# Patient Record
Sex: MALE | Race: WHITE | NOT HISPANIC OR LATINO | URBAN - METROPOLITAN AREA
[De-identification: names, ages, dates, MRNs, and addresses within clinical notes are randomized per-mention and may not be internally consistent; named-entity substitution may affect disease eponyms.]

---

## 2018-11-01 ENCOUNTER — OUTPATIENT (OUTPATIENT)
Dept: OUTPATIENT SERVICES | Facility: HOSPITAL | Age: 53
LOS: 1 days | Discharge: STILL ADMITTED OR ADMINISTRATIVE | End: 2018-11-01

## 2018-11-02 DIAGNOSIS — F10.20 ALCOHOL DEPENDENCE, UNCOMPLICATED: ICD-10-CM

## 2018-11-02 DIAGNOSIS — I10 ESSENTIAL (PRIMARY) HYPERTENSION: ICD-10-CM

## 2018-11-02 DIAGNOSIS — F39 UNSPECIFIED MOOD [AFFECTIVE] DISORDER: ICD-10-CM

## 2020-09-15 PROBLEM — Z00.00 ENCOUNTER FOR PREVENTIVE HEALTH EXAMINATION: Status: ACTIVE | Noted: 2020-09-15

## 2020-10-01 ENCOUNTER — APPOINTMENT (OUTPATIENT)
Dept: ORTHOPEDIC SURGERY | Facility: CLINIC | Age: 55
End: 2020-10-01
Payer: MEDICAID

## 2020-10-01 VITALS
HEIGHT: 73 IN | BODY MASS INDEX: 35.12 KG/M2 | DIASTOLIC BLOOD PRESSURE: 82 MMHG | WEIGHT: 265 LBS | SYSTOLIC BLOOD PRESSURE: 135 MMHG

## 2020-10-01 DIAGNOSIS — Z78.9 OTHER SPECIFIED HEALTH STATUS: ICD-10-CM

## 2020-10-01 PROCEDURE — 20610 DRAIN/INJ JOINT/BURSA W/O US: CPT | Mod: 50

## 2020-10-01 PROCEDURE — 99204 OFFICE O/P NEW MOD 45 MIN: CPT | Mod: 25

## 2020-10-01 PROCEDURE — 73564 X-RAY EXAM KNEE 4 OR MORE: CPT | Mod: 50

## 2020-10-01 RX ORDER — HYALURONATE SODIUM 20 MG/2 ML
20 SYRINGE (ML) INTRAARTICULAR
Qty: 2 | Refills: 0 | Status: COMPLETED | COMMUNITY
Start: 2020-10-01 | End: 2020-10-04

## 2020-10-04 NOTE — HISTORY OF PRESENT ILLNESS
[4] : a current pain level of 4/10 [Sitting] : worsened by sitting [Ice] : relieved by ice [de-identified] : SCOTTY is a 55 year old male who presents today for initial evaluation of bilateral knee pain that is cramping in nature. He was previously treated by Dr. Calhoun and performed PT.\par

## 2020-10-04 NOTE — PHYSICAL EXAM
[de-identified] : Bilateral knee: There is moderate effusion. Range of motion is 0-120°. Painful at the extremes of range of motion. \par There is medial and lateral joint line tenderness. \par Quadriceps strength is 4/5. There is some muscle loss in the quadriceps. \par Anteroposterior and mediolateral stability is intact Misha test is negative. Alignment of the knee is in 5° of varus. [de-identified] : Long standing knee, AP knee, lateral knee, and patellar views of the BILAT knees were ordered and taken in the office and demonstrate degenerative joint disease of the medial, lateral and patellofemoral compartments of the knee with joint space narrowing, osteophyte formation, and subchondral sclerosis.

## 2020-10-04 NOTE — PROCEDURE
[de-identified] : Injection: Right knee joint.\par Indication: OA/ Pain\par \par A discussion was had with the patient regarding this procedure and all questions were answered. All risks, benefits and alternatives were discussed. These included but were not limited to bleeding, infection, and allergic reaction. Alcohol was used to clean the skin, and betadine was used to sterilize and prep the area in the supero-lateral aspect of the right knee. Ethyl chloride spray was then used as a topical anesthetic. A 22-gauge needle was used to inject 3cc of 1% Xylocaine, 2cc of 0.25% Bupivacaine and 1cc of 40mg/mL Triamcinolone Acetonide into the right knee. A sterile bandage was then applied. The patient tolerated the procedure well and there were no complications\par \par Injection: Left knee joint.\par Indication: OA/ Pain\par \par A discussion was had with the patient regarding this procedure and all questions were answered. All risks, benefits and alternatives were discussed. These included but were not limited to bleeding, infection, and allergic reaction. Alcohol was used to clean the skin, and betadine was used to sterilize and prep the area in the supero-lateral aspect of the left knee. Ethyl chloride spray was then used as a topical anesthetic. A 22-gauge needle was used to inject 3cc of 1% Xylocaine, 2cc of 0.25% Bupivacaine and 1cc of 40mg/mL Triamcinolone Acetonide into the left knee. A sterile bandage was then applied. The patient tolerated the procedure well and there were no complications.\par

## 2020-10-04 NOTE — DISCUSSION/SUMMARY
[de-identified] : SCOTTY VILLALBA is a 55 year male experiencing BILAT knee pain, which is moderate in intensity. He admits to sitting causing stiffness and difficulty with standing. Once he starts walking he feels it gets better. He loves to be active and walks a lot and plays golf of which walking more than 9 holes is bothersome to his knees. He denies instability.\par \par After review of his radiographs we discussed that his primary complaint for pain to be arthritis. I discussed the treatment of degenerative arthritis with the patient at length today. I described the spectrum of treatment from nonoperative modalities to total joint arthroplasty. Noninvasive and nonoperative treatment modalities include weight reduction, activity modification with low impact exercise, PRN use of acetaminophen or anti-inflammatory medication if tolerated, glucosamine/chondroitin supplements, and physical therapy. Further treatments can include corticosteroid injection and the use of hyaluronic acid injections. Definitive treatment can certainly include total joint arthroplasty also. The risks and benefits of each treatment options was discussed and all questions were answered. Patient due to his acute pain elects for BILAT knee injections. He denies diabetes. He tolerated the procedures well. He may attempt PT for gentle ROM, strengthening and decreasing pain modalities. If it causes an increase in pain he will d/c. We will call us once the cortisone injections wear off to receive gel injections if approved by his insurance. They were ordered today. He agrees with the above plan and all questions were answered.\par \par \par

## 2020-11-30 ENCOUNTER — APPOINTMENT (OUTPATIENT)
Dept: ORTHOPEDIC SURGERY | Facility: CLINIC | Age: 55
End: 2020-11-30
Payer: MEDICAID

## 2020-11-30 PROCEDURE — 99072 ADDL SUPL MATRL&STAF TM PHE: CPT

## 2020-11-30 PROCEDURE — 20610 DRAIN/INJ JOINT/BURSA W/O US: CPT | Mod: RT

## 2020-11-30 PROCEDURE — 99214 OFFICE O/P EST MOD 30 MIN: CPT | Mod: 25

## 2020-11-30 NOTE — PHYSICAL EXAM
[de-identified] : Bilateral Knee Physical Examination:\par \par General: Alert and oriented x3.  In no acute distress.  Pleasant in nature with a normal affect.  No apparent respiratory distress. \par \par Erythema, Warmth, Rubor: Negative\par Swelling/Edema: Negative\par ROM: 0-120 degrees\par Misha's Test: Negative \par Medial Joint Line TTP: Positive\par Lateral Joint Line TTP: Positive\par Lachman Exam/Anterior Drawer/Pivot Shift Test: Negative \par MCL Pain: Negative\par LCL Pain: Negative\par Iliotibial Band Pain: Negative\par Patellofemoral Joint Pain: Negative\par Patellar Tendon TTP: Negative\par Pes Anserinus TTP: Negative\par Homans Sign: Negative\par Posterior Knee Pain: Negative\par Neuro: Intact with no sensory or motor deficits\par  [de-identified] : X-rays of bilateral knees reviewed: Right knee severe bone-on-bone osteoarthritis lateral and patellofemoral compartments. Left knee arthritis tricompartmental.

## 2020-11-30 NOTE — DISCUSSION/SUMMARY
[de-identified] : Assessment: Bilateral Knee Osteoarthritis/Pain\par \par Plan:\par 1. RICE Therapy.\par 2. Antiinflammatories/Tylenol as needed for pain of discomfort. \par 3. The patient was advised to rest the knee for 24-48 hours post injection.  The patient is able to resume normal activities in 24-48 hours post injection if the patient is experiencing minimal to no pain. \par 4. Continue with a home exercise/stretching program. \par 5. All the patients questions were answered.  If the patient is experiencing any problems or has concerns they were advised to call the office or make an appointment to come in to be evaluated.\par \par *The patient will follow up next week for a left knee cortisone injection.\par

## 2020-11-30 NOTE — HISTORY OF PRESENT ILLNESS
[de-identified] : Jones is a 54 yo male who presents with bilateral knee pain from OA, R>L.  The patient is seeking a cortisone injection for both knees.  He denies locking or catching of the knees.  No other complaint.\par \par Saw Dr. Nicole beginning of October where he received a cortisone injection bilateral knees.

## 2020-11-30 NOTE — PROCEDURE
[de-identified] : Laterality: Right Knee\par \par The risks and benefits of the injection were reviewed with the patient today in office and all their questions were answered.  The injection site was the anterolateral aspect of the knee with the patient sitting up, knees flexed to 90 degrees.  Prior to giving the injection the injection site was prepped with Chloraprep and a sterile field was created.  Sterile technique was carried out throughout the procedure.  After verbal consent from the patient we went ahead and injected the Right knee today with 2 ml 40 mg Depo-Medrol, 4 ml 1% lidocaine and 4 ml of .50% Bupivacaine for a total of 10 ml with a 22 geo 1.5" needle.  The medication was placed into the knee without complication.  Post injection the patient reported no pain, had a normal gait and good motion of the knee.  The patient denied numbness and tingling going down their leg.  There were no complications during the procedure.\par

## 2020-12-07 ENCOUNTER — APPOINTMENT (OUTPATIENT)
Dept: ORTHOPEDIC SURGERY | Facility: CLINIC | Age: 55
End: 2020-12-07
Payer: MEDICAID

## 2020-12-07 PROCEDURE — 99072 ADDL SUPL MATRL&STAF TM PHE: CPT

## 2020-12-07 PROCEDURE — 99213 OFFICE O/P EST LOW 20 MIN: CPT | Mod: 25

## 2020-12-07 PROCEDURE — 20610 DRAIN/INJ JOINT/BURSA W/O US: CPT | Mod: LT

## 2020-12-07 RX ORDER — MELOXICAM 15 MG/1
15 TABLET ORAL
Qty: 1 | Refills: 2 | Status: ACTIVE | COMMUNITY
Start: 2020-12-07 | End: 1900-01-01

## 2020-12-07 NOTE — DISCUSSION/SUMMARY
[de-identified] : Assessment: Bilateral Knee Osteoarthritis/Pain\par \par Plan:\par 1. RICE Therapy.\par 2. Antiinflammatories/Tylenol as needed for pain of discomfort. \par 3. The patient was advised to rest the knee for 24-48 hours post injection.  The patient is able to resume normal activities in 24-48 hours post injection if the patient is experiencing minimal to no pain. \par 4. Continue with a home exercise/stretching program. \par 5. All the patients questions were answered.  If the patient is experiencing any problems or has concerns they were advised to call the office or make an appointment to come in to be evaluated.\par \par *followup as needed.\par

## 2020-12-07 NOTE — PHYSICAL EXAM
[de-identified] : Bilateral Knee Physical Examination:\par \par General: Alert and oriented x3.  In no acute distress.  Pleasant in nature with a normal affect.  No apparent respiratory distress. \par \par Erythema, Warmth, Rubor: Negative\par Swelling/Edema: Negative\par ROM: 0-120 degrees\par Misha's Test: Negative \par Medial Joint Line TTP: Positive\par Lateral Joint Line TTP: Positive\par Lachman Exam/Anterior Drawer/Pivot Shift Test: Negative \par MCL Pain: Negative\par LCL Pain: Negative\par Iliotibial Band Pain: Negative\par Patellofemoral Joint Pain: Negative\par Patellar Tendon TTP: Negative\par Pes Anserinus TTP: Negative\par Homans Sign: Negative\par Posterior Knee Pain: Negative\par Neuro: Intact with no sensory or motor deficits\par  [de-identified] : X-rays of bilateral knees reviewed: Right knee severe bone-on-bone osteoarthritis lateral and patellofemoral compartments. Left knee arthritis tricompartmental.

## 2020-12-07 NOTE — PROCEDURE
[de-identified] : Laterality: left Knee\par \par The risks and benefits of the injection were reviewed with the patient today in office and all their questions were answered.  The injection site was the anterolateral aspect of the knee with the patient sitting up, knees flexed to 90 degrees.  Prior to giving the injection the injection site was prepped with Chloraprep and a sterile field was created.  Sterile technique was carried out throughout the procedure.  After verbal consent from the patient we went ahead and injected the Left knee today with 2 ml 40 mg Depo-Medrol, 4 ml 1% lidocaine and 4 ml of .50% Bupivacaine for a total of 10 ml with a 22 geo 1.5" needle.  The medication was placed into the knee without complication.  Post injection the patient reported no pain, had a normal gait and good motion of the knee.  The patient denied numbness and tingling going down their leg.  There were no complications during the procedure.\par

## 2020-12-07 NOTE — HISTORY OF PRESENT ILLNESS
[de-identified] : 12/7/20: The patient presents in the office to receive a left knee cortisone injection. His pain scale left knee 4/10. He has no other complaints.\par \par 11/30/20: Jones is a 54 yo male who presents with bilateral knee pain from OA, R>L.  The patient is seeking a cortisone injection for both knees.  He denies locking or catching of the knees.  No other complaint.\par \par Saw Dr. Nicole beginning of October where he received a cortisone injection bilateral knees.

## 2021-05-12 ENCOUNTER — APPOINTMENT (OUTPATIENT)
Dept: ORTHOPEDIC SURGERY | Facility: CLINIC | Age: 56
End: 2021-05-12
Payer: MEDICAID

## 2021-05-12 ENCOUNTER — NON-APPOINTMENT (OUTPATIENT)
Age: 56
End: 2021-05-12

## 2021-05-12 DIAGNOSIS — M25.561 PAIN IN RIGHT KNEE: ICD-10-CM

## 2021-05-12 DIAGNOSIS — M17.0 BILATERAL PRIMARY OSTEOARTHRITIS OF KNEE: ICD-10-CM

## 2021-05-12 DIAGNOSIS — M25.562 PAIN IN RIGHT KNEE: ICD-10-CM

## 2021-05-12 PROCEDURE — 99214 OFFICE O/P EST MOD 30 MIN: CPT | Mod: 25

## 2021-05-12 PROCEDURE — 99072 ADDL SUPL MATRL&STAF TM PHE: CPT

## 2021-05-12 PROCEDURE — 20610 DRAIN/INJ JOINT/BURSA W/O US: CPT | Mod: 50

## 2021-05-12 PROCEDURE — 73562 X-RAY EXAM OF KNEE 3: CPT | Mod: 50

## 2021-05-12 RX ORDER — MELOXICAM 15 MG/1
15 TABLET ORAL
Qty: 30 | Refills: 2 | Status: ACTIVE | COMMUNITY
Start: 2021-05-12 | End: 1900-01-01

## 2021-05-12 RX ORDER — SODIUM HYALURONATE INTRA-ARTICULAR SOLN PREF SYR 25 MG/2.5ML 25/2.5 MG/ML
25 SOLUTION PREFILLED SYRINGE INTRAARTICULAR
Qty: 6 | Refills: 0 | Status: ACTIVE | OUTPATIENT
Start: 2021-05-12

## 2021-05-12 NOTE — REASON FOR VISIT
[Follow-Up Visit] : a follow-up visit for [FreeTextEntry2] : Bilateral knee pain from osteoarthritis.

## 2021-05-12 NOTE — PROCEDURE
[de-identified] : Laterality: Bilateral knee\par \par The risks and benefits of the injection were reviewed with the patient today in office and all their questions were answered.  The injection site was the anterolateral aspect of the knee with the patient sitting up, knees flexed to 90 degrees.  Prior to giving the injection the injection site was prepped with Chloraprep and a sterile field was created.  Sterile technique was carried out throughout the procedure.  After verbal consent from the patient we went ahead and injected the bilateral knee today with 1 ml 40 mg Depo-Medrol, 5 ml 1% lidocaine and 4 ml of .50% Bupivacaine for a total of 10 ml with a 22 geo 1.5" needle.  The medication was placed into the knee without complication.  Post injection the patient reported no pain, had a normal gait and good motion of the knee.  The patient denied numbness and tingling going down their leg.  There were no complications during the procedure.

## 2021-05-12 NOTE — DISCUSSION/SUMMARY
[de-identified] : Assessment: Bilateral knee Osteoarthritis/Pain\par \par Plan:\par 1. RICE Therapy.\par 2. Antiinflammatories/Tylenol as needed for pain of discomfort. \par 3. The patient was advised to rest the knee for 24-48 hours post injection.  The patient is able to resume normal activities in 24-48 hours post injection if the patient is experiencing minimal to no pain. \par 4. Continue with a home exercise/stretching program. \par 5. All the patients questions were answered.  If the patient is experiencing any problems or has concerns they were advised to call the office or make an appointment to come in to be evaluated.\par \par \par Hyaluronic acid injections ordered for bilateral knees to be authorized by his insurance company.

## 2021-05-12 NOTE — HISTORY OF PRESENT ILLNESS
[de-identified] : The patient is a 55-year-old male with bilateral knee pain due to his osteoarthritis.  The patient is not quite ready for a knee replacement or surgery at this time.  He is here seeking cortisone injections for both knees to give him some pain relief.  His pain scale in both knees is 6 out of 10.  He denies locking and catching of both knees.  He has no numbness or tingling going down bilateral lower extremities.

## 2021-05-12 NOTE — PHYSICAL EXAM
[de-identified] : Bilateral knee Physical Examination:\par \par General: Alert and oriented x3.  In no acute distress.  Pleasant in nature with a normal affect.  No apparent respiratory distress. \par \par Erythema, Warmth, Rubor: Negative\par Swelling/Edema: There is some swelling present in both knees.\par ROM: 0-120 degrees\par Misha's Test: Negative \par Medial Joint Line TTP: Positive\par Lateral Joint Line TTP: Positive\par Lachman Exam/Anterior Drawer/Pivot Shift Test: Negative \par MCL Pain: Negative\par LCL Pain: Negative\par Iliotibial Band Pain: Negative\par Patellofemoral Joint Pain: Negative\par Patellar Tendon TTP: Negative\par Pes Anserinus TTP: Negative\par Homans Sign: Negative\par Posterior Knee Pain: Negative\par Neuro: Intact with no sensory or motor deficits [de-identified] : X-rays of bilateral knees reviewed, 5/12/2021: Osteoarthritis present.